# Patient Record
Sex: FEMALE
[De-identification: names, ages, dates, MRNs, and addresses within clinical notes are randomized per-mention and may not be internally consistent; named-entity substitution may affect disease eponyms.]

---

## 2020-08-01 ENCOUNTER — NURSE TRIAGE (OUTPATIENT)
Dept: OTHER | Facility: CLINIC | Age: 85
End: 2020-08-01

## 2020-08-02 NOTE — TELEPHONE ENCOUNTER
Reason for Disposition   [4] Systolic BP  >= 821 OR Diastolic >= 80 AND [3] taking BP medications    Answer Assessment - Initial Assessment Questions  1. BLOOD PRESSURE: \"What is the blood pressure? \" \"Did you take at least two measurements 5 minutes apart? \"      yes  2. ONSET: \"When did you take your blood pressure? \"      Just now  3. HOW: \"How did you obtain the blood pressure? \" (e.g., visiting nurse, automatic home BP monitor)      Home bp machine  4. HISTORY: \"Do you have a history of high blood pressure? \"      yes  5. MEDICATIONS: Lucia Vides you taking any medications for blood pressure? \" \"Have you missed any doses recently? \"      No, have not missed  6. OTHER SYMPTOMS: \"Do you have any symptoms? \" (e.g., headache, chest pain, blurred vision, difficulty breathing, weakness)      Chest heaviness  7. PREGNANCY: \"Is there any chance you are pregnant? \" \"When was your last menstrual period? \"      no    Protocols used: HIGH BLOOD PRESSURE-ADULT-

## 2020-09-07 ENCOUNTER — NURSE TRIAGE (OUTPATIENT)
Dept: OTHER | Facility: CLINIC | Age: 85
End: 2020-09-07